# Patient Record
Sex: MALE | Race: WHITE | NOT HISPANIC OR LATINO | Employment: FULL TIME
[De-identification: names, ages, dates, MRNs, and addresses within clinical notes are randomized per-mention and may not be internally consistent; named-entity substitution may affect disease eponyms.]

---

## 2022-10-02 ENCOUNTER — HEALTH MAINTENANCE LETTER (OUTPATIENT)
Age: 37
End: 2022-10-02

## 2023-10-21 ENCOUNTER — HEALTH MAINTENANCE LETTER (OUTPATIENT)
Age: 38
End: 2023-10-21

## 2024-04-05 ENCOUNTER — LAB (OUTPATIENT)
Dept: LAB | Facility: CLINIC | Age: 39
End: 2024-04-05
Attending: PHYSICIAN ASSISTANT
Payer: COMMERCIAL

## 2024-04-05 ENCOUNTER — VIRTUAL VISIT (OUTPATIENT)
Dept: URGENT CARE | Facility: CLINIC | Age: 39
End: 2024-04-05
Payer: COMMERCIAL

## 2024-04-05 DIAGNOSIS — J02.0 STREPTOCOCCAL PHARYNGITIS: Primary | ICD-10-CM

## 2024-04-05 DIAGNOSIS — J02.9 SORE THROAT: ICD-10-CM

## 2024-04-05 LAB — DEPRECATED S PYO AG THROAT QL EIA: POSITIVE

## 2024-04-05 PROCEDURE — 87880 STREP A ASSAY W/OPTIC: CPT

## 2024-04-05 PROCEDURE — 99441 PR PHYSICIAN TELEPHONE EVALUATION 5-10 MIN: CPT

## 2024-04-05 RX ORDER — AMOXICILLIN 500 MG/1
1000 CAPSULE ORAL DAILY
Qty: 20 CAPSULE | Refills: 0 | Status: SHIPPED | OUTPATIENT
Start: 2024-04-05 | End: 2024-04-15

## 2024-04-05 NOTE — PATIENT INSTRUCTIONS
1) Increase rest and fluid intake.  2) Take Tylenol 650mg every 4 hours or ibuprofen 600mg every 6 hours by mouth for pain/fever.  Do not exceed 4000mg of acetaminophen or 2400mg of ibuprofen from any source in a 24 hour period.  Taking Tylenol and ibuprofen together may be helpful in reducing pain.   3) Strep infection is considered contagious until treated for 24 hours; avoid attending school or work during contagious period.  4) Complete full course of antibiotics.   5) Replace toothbrush after being on the antibiotic for 48 hours to avoid reinfection   6) Return if not resolved in one week or sooner if worsening.    Strep Throat  Strep throat is a throat infection caused by a bacteria called group A Streptococcus bacteria (group A strep). The bacteria live in the nose and throat. Strep throat is contagious and spreads easily from person to person through airborne droplets when an infected person coughs, sneezes, or talks. Good hand washing is important to help prevent the spread of this illness.  Children diagnosed with strep throat should not attend school or  until they have been taking antibiotics and had no fever for 24 hours.  Strep throat mainly affects school-aged children between 5 and 15 years of age, but can affect adults too. When it isn't treated, it can lead to serious problems including rheumatic fever (an inflammation of the joints and heart) and kidney damage.    How is strep throat spread?  Strep throat can be easily spread from an infected person's saliva by:  Drinking and eating after them  Sharing a straw, cup, toothbrushes, and eating utensils  When to go to the emergency room (ER)  Call 911 if you have trouble breathing or swallowing, or signs of dehydration (no urination in 8 hours, or dark urine, dry mouth).  When to call your healthcare provider  Call your healthcare provider if you have finished the treatment for strep throat and have:  Joint pain or swelling  Ear pain or  pressure  Headaches  Rash  Fever      Easing strep throat symptoms  These tips can help ease your symptoms:  Use Tylenol or ibuprofen.   Eat easy-to-swallow foods, such as soup, applesauce, popsicles, cold drinks, milk shakes, and yogurt. Avoid spicy or acidic foods.  Use a cool-mist humidifier in your bedroom.  Gargle with saltwater. Mix 1/4 teaspoon salt in 1 cup (8 oz) of warm water.

## 2024-04-05 NOTE — PROGRESS NOTES
Malik Smiley is being evaluated via a billable telephone visit.      Assessment & Plan:     Strep test ordered. Suspect strep pharyngitis vs viral pharyngitis. Will treat as appropriate for positive strep, otherwise supportive cares recommended.     See patient instructions below.  At the end of the encounter, I discussed diagnosis & medications. Discussed red flags for being seen in person at clinic/ER, as well as indications for follow up if no improvement. Patient understood and agreed to plan.       ICD-10-CM    1. Sore throat  J02.9 Streptococcus A Rapid Screen w/Reflex to PCR - Clinic Collect            No follow-ups on file.    Phone Call Duration : 5  minutes  Additional time spent documenting and reviewing chart:    ROSA Chew, HIRAM GRAJEDA UNSCHEDULED CARE  -----------------------------------------------------------------------------------------------------------------------------------------------------------------    Subjective:   Malik Smiley is a 39 year old male who is contacted via telephone through scheduled urgent care virtual visit to discuss: No chief complaint on file.      Sore throat & low grade fever onset 2 days ago. Tmax 100 F. No treatments tried. Patient reports no congestion, cough, headache, chest pain, shortness of breath, abdominal pain, nausea, vomiting, diarrhea, rash, or any other symptoms.     History reviewed. No pertinent past medical history.      Objective:   Gen:  NAD  Pulm: non-labored work of breathing    No results found for any visits on 04/05/24.    There are no Patient Instructions on file for this visit.

## 2024-04-24 ENCOUNTER — THERAPY VISIT (OUTPATIENT)
Dept: PHYSICAL THERAPY | Facility: CLINIC | Age: 39
End: 2024-04-24
Payer: COMMERCIAL

## 2024-04-24 DIAGNOSIS — M75.21 BICEPS TENDONITIS, RIGHT: Primary | ICD-10-CM

## 2024-04-24 PROCEDURE — 97161 PT EVAL LOW COMPLEX 20 MIN: CPT | Mod: GP | Performed by: PHYSICAL THERAPIST

## 2024-04-24 PROCEDURE — 97110 THERAPEUTIC EXERCISES: CPT | Mod: GP | Performed by: PHYSICAL THERAPIST

## 2024-04-24 NOTE — PROGRESS NOTES
PHYSICAL THERAPY EVALUATION  Type of Visit: Evaluation    See electronic medical record for Abuse and Falls Screening details.    Pt presents to PT with 6wk history of R midbelly bicep pain and weakness. He is limited with lifting weights and lifting 5year old son. His goal is to return to painfree lifting.    Subjective       Presenting condition or subjective complaint: bicep tendonitis  Date of onset: 02/28/24    Relevant medical history: Migraines or headaches   Dates & types of surgery: none    Prior diagnostic imaging/testing results:       Prior therapy history for the same diagnosis, illness or injury: No      Prior Level of Function  Transfers:   Ambulation:   ADL:   IADL:     Living Environment  Social support: With a significant other or spouse   Type of home: House   Stairs to enter the home:         Ramp:     Stairs inside the home:         Help at home:    Equipment owned:       Employment: Yes    Hobbies/Interests: weight lifting    Patient goals for therapy: weight lifting    Pain assessment: pain with active resisted R elbow flexion     Objective   SHOULDER EVALUATION  PAIN:   INTEGUMENTARY (edema, incisions):   POSTURE: good posture awareness  GAIT:   Weightbearing Status:   Assistive Device(s):   Gait Deviations:   BALANCE/PROPRIOCEPTION:   WEIGHTBEARING ALIGNMENT:   ROM: full and painfree B shoulder AROM  STRENGTH: BUE normal 5/5 except R elbow flx painful and 4+/5 and R ER 4+/5  FLEXIBILITY:   SPECIAL TESTS: neer neg, vega-saturnino neg, speeds + at 90 and + at 120degs  PALPATION: TTP distal midbelly R bicep  JOINT MOBILITY: WNL BUE  CERVICAL SCREEN: WNL and painfree    Assessment & Plan   CLINICAL IMPRESSIONS  Medical Diagnosis:      Treatment Diagnosis: R bicep tendonitis   Impression/Assessment: Patient is a 39 year old male with R bicep pain complaints.  The following significant findings have been identified: Pain and Decreased strength. These impairments interfere with their ability to  perform recreational activities and household chores as compared to previous level of function.     Clinical Decision Making (Complexity):  Clinical Presentation: Stable/Uncomplicated  Clinical Presentation Rationale: based on medical and personal factors listed in PT evaluation  Clinical Decision Making (Complexity): Low complexity    PLAN OF CARE  Treatment Interventions:  Modalities: Cryotherapy, Hot Pack  Interventions: Manual Therapy, Neuromuscular Re-education, Therapeutic Activity, Therapeutic Exercise    Long Term Goals     PT Goal 1  Goal Identifier: lifting  Goal Description: pt will be able to return to full weight lifting painfree  Rationale: to maximize safety and independence within the community  Target Date: 07/17/24      Frequency of Treatment: 2x/month  Duration of Treatment: 3month    Recommended Referrals to Other Professionals:   Education Assessment:   Learner/Method: Patient;Demonstration;Pictures/Video  Education Comments: anatomy and mechanics, rehab progression and expectations    Risks and benefits of evaluation/treatment have been explained.   Patient/Family/caregiver agrees with Plan of Care.     Evaluation Time:     PT Eval, Low Complexity Minutes (07080): 15       Signing Clinician: Koko White PT

## 2024-06-25 SDOH — HEALTH STABILITY: PHYSICAL HEALTH: ON AVERAGE, HOW MANY MINUTES DO YOU ENGAGE IN EXERCISE AT THIS LEVEL?: 60 MIN

## 2024-06-25 SDOH — HEALTH STABILITY: PHYSICAL HEALTH: ON AVERAGE, HOW MANY DAYS PER WEEK DO YOU ENGAGE IN MODERATE TO STRENUOUS EXERCISE (LIKE A BRISK WALK)?: 7 DAYS

## 2024-06-25 ASSESSMENT — SOCIAL DETERMINANTS OF HEALTH (SDOH): HOW OFTEN DO YOU GET TOGETHER WITH FRIENDS OR RELATIVES?: ONCE A WEEK

## 2024-06-26 ENCOUNTER — OFFICE VISIT (OUTPATIENT)
Dept: FAMILY MEDICINE | Facility: CLINIC | Age: 39
End: 2024-06-26
Payer: COMMERCIAL

## 2024-06-26 VITALS
HEIGHT: 69 IN | BODY MASS INDEX: 27.7 KG/M2 | WEIGHT: 187 LBS | RESPIRATION RATE: 16 BRPM | DIASTOLIC BLOOD PRESSURE: 66 MMHG | SYSTOLIC BLOOD PRESSURE: 106 MMHG | OXYGEN SATURATION: 97 % | TEMPERATURE: 98.9 F | HEART RATE: 64 BPM

## 2024-06-26 DIAGNOSIS — Z00.00 ENCOUNTER FOR ROUTINE ADULT HEALTH EXAMINATION WITHOUT ABNORMAL FINDINGS: Primary | ICD-10-CM

## 2024-06-26 DIAGNOSIS — Z30.2 ENCOUNTER FOR VASECTOMY: ICD-10-CM

## 2024-06-26 DIAGNOSIS — Z13.220 LIPID SCREENING: ICD-10-CM

## 2024-06-26 DIAGNOSIS — M75.21 BICEPS TENDONITIS, RIGHT: ICD-10-CM

## 2024-06-26 DIAGNOSIS — Z00.00 HEALTHCARE MAINTENANCE: ICD-10-CM

## 2024-06-26 DIAGNOSIS — L65.9 ALOPECIA: ICD-10-CM

## 2024-06-26 PROCEDURE — 99385 PREV VISIT NEW AGE 18-39: CPT | Performed by: NURSE PRACTITIONER

## 2024-06-26 PROCEDURE — 99213 OFFICE O/P EST LOW 20 MIN: CPT | Mod: 25 | Performed by: NURSE PRACTITIONER

## 2024-06-26 RX ORDER — FINASTERIDE 5 MG/1
5 TABLET, FILM COATED ORAL
COMMUNITY
Start: 2023-03-21 | End: 2024-06-26

## 2024-06-26 RX ORDER — FINASTERIDE 5 MG/1
TABLET, FILM COATED ORAL
Qty: 30 TABLET | Refills: 3 | Status: SHIPPED | OUTPATIENT
Start: 2024-06-26

## 2024-06-26 RX ORDER — MULTIPLE VITAMINS W/ MINERALS TAB 9MG-400MCG
1 TAB ORAL DAILY
COMMUNITY

## 2024-06-26 ASSESSMENT — PAIN SCALES - GENERAL: PAINLEVEL: MODERATE PAIN (4)

## 2024-06-26 ASSESSMENT — PATIENT HEALTH QUESTIONNAIRE - PHQ9
10. IF YOU CHECKED OFF ANY PROBLEMS, HOW DIFFICULT HAVE THESE PROBLEMS MADE IT FOR YOU TO DO YOUR WORK, TAKE CARE OF THINGS AT HOME, OR GET ALONG WITH OTHER PEOPLE: SOMEWHAT DIFFICULT
SUM OF ALL RESPONSES TO PHQ QUESTIONS 1-9: 9
SUM OF ALL RESPONSES TO PHQ QUESTIONS 1-9: 9

## 2024-06-26 NOTE — PROGRESS NOTES
"Preventive Care Visit  [facility] VENKAT Villareal, JOYCELYN CNP, Nurse Practitioner  Jun 26, 2024      Assessment & Plan   Problem List Items Addressed This Visit       Biceps tendonitis, right     No improvement with resting and PT. Referral given for Sports medicine         Relevant Orders    Orthopedic  Referral    Healthcare maintenance     Hep B declines  Hep C/hiv declines  Lipid screening         Relevant Orders    Glucose    Alopecia     Male pattern baldness  Takes Finasteride 1/4 tablet daily, refills given         Relevant Medications    finasteride (PROSCAR) 5 MG tablet     Other Visit Diagnoses       Encounter for routine adult health examination without abnormal findings    -  Primary    Encounter for vasectomy        Relevant Orders    Adult Urology  Referral    Testosterone Free and Total    Lipid screening        Relevant Orders    Lipid panel reflex to direct LDL Fasting    Lipoprotein (a)         Boy  forms filled out.     Patient has been advised of split billing requirements and indicates understanding: Yes       BMI  Estimated body mass index is 28.02 kg/m  as calculated from the following:    Height as of this encounter: 1.74 m (5' 8.5\").    Weight as of this encounter: 84.8 kg (187 lb).       Counseling  Appropriate preventive services were discussed with this patient, including applicable screening as appropriate for fall prevention, nutrition, physical activity, Tobacco-use cessation, weight loss and cognition.  Checklist reviewing preventive services available has been given to the patient.  Reviewed patient's diet, addressing concerns and/or questions.   The patient's PHQ-9 score is consistent with mild depression. He was provided with information regarding depression.     FUTURE APPOINTMENTS:       - Follow-up for annual visit or as needed      Liza   Malik is a 39 year old, presenting for the following:  Establish Care and Physical     "     Health Care Directive  Patient does not have a Health Care Directive or Living Will: Discussed advance care planning with patient; information given to patient to review.    HPI  Right bicep tendonitis; tried PT without relief  Nutrition: protein, foods, whole foods, wife is a vegetarian   Exercise/movement: lifting and run 4 days a week; on biking on the other days  Sleep; 7 hours of sleep no interruptions  Stress: manage  Alcohol: rarely  Tobacco: none  Drugs: marjiuana edible 1 every 2 weeks    Family history  updated      6/25/2024   General Health   How would you rate your overall physical health? Excellent   Feel stress (tense, anxious, or unable to sleep) Very much      (!) STRESS CONCERN      6/25/2024   Nutrition   Three or more servings of calcium each day? Yes   Diet: Regular (no restrictions)   How many servings of fruit and vegetables per day? (!) 2-3   How many sweetened beverages each day? 0-1            6/25/2024   Exercise   Days per week of moderate/strenous exercise 7 days   Average minutes spent exercising at this level 60 min            6/25/2024   Social Factors   Frequency of gathering with friends or relatives Once a week   Worry food won't last until get money to buy more No   Food not last or not have enough money for food? No   Do you have housing? (Housing is defined as stable permanent housing and does not include staying ouside in a car, in a tent, in an abandoned building, in an overnight shelter, or couch-surfing.) No   Are you worried about losing your housing? No   Lack of transportation? No   Unable to get utilities (heat,electricity)? No   Want help with housing or utility concern? No      (!) HOUSING CONCERN PRESENT      6/25/2024   Dental   Dentist two times every year? Yes            6/25/2024   TB Screening   Were you born outside of the [place]? No          Today's PHQ-9 Score:       6/26/2024    11:50 AM   PHQ-9 SCORE   PHQ-9 Total Score MyChart 9 (Mild depression)   PHQ-9  "Total Score 9         6/25/2024   Substance Use   Alcohol more than 3/day or more than 7/wk No   Do you use any other substances recreationally? (!) CANNABIS PRODUCTS    (!) SYNTHETIC MARIJUANA       Multiple values from one day are sorted in reverse-chronological order     Social History     Tobacco Use    Smoking status: Never    Smokeless tobacco: Never           6/25/2024   One time HIV Screening   Previous HIV test? No          6/25/2024   STI Screening   New sexual partner(s) since last STI/HIV test? No            6/25/2024   Contraception/Family Planning   Questions about contraception or family planning (!) YES considering for vasectomy        Reviewed and updated as needed this visit by Provider                    No past medical history on file.  No past surgical history on file.      Review of Systems  CONSTITUTIONAL: NEGATIVE for fever, chills, change in weight  INTEGUMENTARY/SKIN: NEGATIVE for worrisome rashes, moles or lesions  EYES: NEGATIVE for vision changes or irritation  ENT/MOUTH: NEGATIVE for ear, mouth and throat problems  RESP: NEGATIVE for significant cough or SOB  BREAST: NEGATIVE for masses, tenderness or discharge  CV: NEGATIVE for chest pain, palpitations or peripheral edema  GI: NEGATIVE for nausea, abdominal pain, heartburn, or change in bowel habits  : NEGATIVE for frequency, dysuria, or hematuria  MUSCULOSKELETAL:POSITIVE  for right bicep tendonitis  NEURO: NEGATIVE for weakness, dizziness or paresthesias  ENDOCRINE: NEGATIVE for temperature intolerance, skin/hair changes  HEME: NEGATIVE for bleeding problems  PSYCHIATRIC: NEGATIVE for changes in mood or affect     Objective    Exam  /66 (BP Location: Right arm, Patient Position: Sitting, Cuff Size: Adult Large)   Pulse 64   Temp 98.9  F (37.2  C) (Tympanic)   Resp 16   Ht 1.74 m (5' 8.5\")   Wt 84.8 kg (187 lb)   SpO2 97%   BMI 28.02 kg/m     Estimated body mass index is 28.02 kg/m  as calculated from the following:    " "Height as of this encounter: 1.74 m (5' 8.5\").    Weight as of this encounter: 84.8 kg (187 lb).    Physical Exam  Constitutional:       Appearance: Normal appearance.   HENT:      Head: Normocephalic.      Right Ear: Tympanic membrane, ear canal and external ear normal.      Left Ear: Tympanic membrane, ear canal and external ear normal.      Nose: Nose normal.      Mouth/Throat:      Mouth: Mucous membranes are moist.      Pharynx: Oropharynx is clear.      Tonsils: 0 on the right. 0 on the left.   Eyes:      Extraocular Movements: Extraocular movements intact.      Conjunctiva/sclera: Conjunctivae normal.      Pupils: Pupils are equal, round, and reactive to light.   Neck:      Thyroid: No thyroid mass, thyromegaly or thyroid tenderness.      Trachea: Trachea normal.   Cardiovascular:      Rate and Rhythm: Normal rate and regular rhythm.      Heart sounds: Normal heart sounds.   Pulmonary:      Effort: Pulmonary effort is normal.      Breath sounds: Normal breath sounds.   Abdominal:      General: Abdomen is flat. Bowel sounds are normal.   Musculoskeletal:         General: Normal range of motion.      Cervical back: Normal range of motion.   Lymphadenopathy:      Cervical: No cervical adenopathy.   Skin:     General: Skin is warm and dry.   Neurological:      Mental Status: He is alert and oriented to person, place, and time.   Psychiatric:         Mood and Affect: Mood normal.         Behavior: Behavior normal.         Thought Content: Thought content normal.         Judgment: Judgment normal.               Signed Electronically by: JOYCELYN Pearson CNP    Answers submitted by the patient for this visit:  Patient Health Questionnaire (Submitted on 6/26/2024)  If you checked off any problems, how difficult have these problems made it for you to do your work, take care of things at home, or get along with other people?: Somewhat difficult  PHQ9 TOTAL SCORE: 9    "

## 2024-10-13 NOTE — PROGRESS NOTES
UROLOGY CLINIC NEW VISIT    Chief Complaint:  Desire for Vasectomy    Referring Provider:  Gwen Villareal    Subjective:     Malik Smiley is a 39 year old male and is a new patient to the urology clinic seen today for family planning in consultation for Dr. Orourke     No prior surgeries , no other significant medical history.   No anticoagulation or bleeding dyscrasias    Has discussed with partner they are in agreement . Has 3 kids no ED or EJD    Currently the patient has no fever, chills, nausea, vomiting or other signs/symptoms suggestive of acute systemic infection.    PMH  No past medical history on file.  PSH  No past surgical history on file.  FAMHx  Family History   Problem Relation Age of Onset    No Known Problems Mother     Prostate Cancer Father     No Known Problems Sister     No Known Problems Sister     Cancer Maternal Grandmother     Myocardial Infarction Maternal Grandfather     Other - See Comments Paternal Grandmother         old age    Lung Cancer Paternal Grandfather     No Known Problems Son     No Known Problems Son     No Known Problems Son      ALLERGY  Allergies   Allergen Reactions    Ragweeds      MEDICATIONS  has a current medication list which includes the following prescription(s): finasteride and multivitamin w/minerals.  ROS:  Constitutional: negative  Eyes: negative  Ears/Nose/Throat/Mouth: negative  Respiratory: negative  Cardiovascular: negative  Gastrointestinal: negative  Genito-Urinary: as documented above in HPI  Musculoskeletal: negative  Neurological: negative  Integumentary: negative      Objective:     There were no vitals taken for this visit.   Physical Exam:  GENERAL: Patient is AOx3, in no acute distress  CARDIAC: regular rate  LUNG: breathing non labored  ABD: soft, nondistended  : normal phallus, testes descended bilaterally  Testes without masses.  Vasa and epididymides are present bilaterally.    Vasa palpable bilaterally 1         LABORATORY:  No  "results found for: \"CREATININE\"  No results found for: \"PSA\"     Assessment:       Malik Smiley is a 39 year old male and is a new patient to the urology clinic seen today for family planning in consultation, desiring elective vasectomy.      Plan:     We discussed with the patient the following:  Vasectomy is intended to be a permanent form of contraception.   Vasectomy does not produce immediate sterility.   Following vasectomy, another form of contraception is required until vas occlusion is confirmed by post- vasectomy semen analysis (PVSA).   Even after vas occlusion is confirmed, vasectomy is not 100% reliable in preventing pregnancy.   The risk of pregnancy after vasectomy is approximately 1 in 2,000 for men who have post-vasectomy azoospermia or PVSA showing rare non-motile sperm (RNMS).   Repeat vasectomy is necessary in ?1% of vasectomies, provided that a technique for vas occlusion known to have a low occlusive failure rate has been used.   Patients should refrain from ejaculation for approximately one week after vasectomy.   Options for fertility after vasectomy include vasectomy reversal and sperm retrieval with in vitro fertilization. These options are not always successful, and they may be expensive.   The rates of surgical complications such as symptomatic hematoma and infection are 1-2%. These rates vary with the surgeon's experience and the criteria used to diagnose these conditions.   Chronic scrotal pain associated with negative impact on quality of life occurs after vasectomy in about 1-2% of men. Few of these men require additional surgery.   Other permanent and non-permanent alternatives to vasectomy are available.    We discussed the need for a  if he chooses to take Valium 20 mg one hour prior to the procedure, he will decide and let us know.   He understands all risks and benefits and voiced thorough understanding, his questions were answered.  We discussed his postoperative care. He " signed the surgical permit and will be scheduled presently.

## 2024-10-14 ENCOUNTER — OFFICE VISIT (OUTPATIENT)
Dept: UROLOGY | Facility: CLINIC | Age: 39
End: 2024-10-14
Attending: NURSE PRACTITIONER
Payer: COMMERCIAL

## 2024-10-14 VITALS
HEIGHT: 69 IN | DIASTOLIC BLOOD PRESSURE: 73 MMHG | TEMPERATURE: 97.8 F | OXYGEN SATURATION: 99 % | SYSTOLIC BLOOD PRESSURE: 123 MMHG | BODY MASS INDEX: 28.02 KG/M2 | HEART RATE: 63 BPM

## 2024-10-14 DIAGNOSIS — Z30.2 ENCOUNTER FOR VASECTOMY: ICD-10-CM

## 2024-10-14 PROCEDURE — 99203 OFFICE O/P NEW LOW 30 MIN: CPT | Performed by: STUDENT IN AN ORGANIZED HEALTH CARE EDUCATION/TRAINING PROGRAM

## 2024-10-14 ASSESSMENT — PAIN SCALES - GENERAL: PAINLEVEL: NO PAIN (0)

## 2024-11-05 ENCOUNTER — OFFICE VISIT (OUTPATIENT)
Dept: FAMILY MEDICINE | Facility: CLINIC | Age: 39
End: 2024-11-05
Payer: COMMERCIAL

## 2024-11-05 VITALS
DIASTOLIC BLOOD PRESSURE: 74 MMHG | HEIGHT: 69 IN | BODY MASS INDEX: 28.23 KG/M2 | TEMPERATURE: 97.5 F | RESPIRATION RATE: 12 BRPM | WEIGHT: 190.6 LBS | HEART RATE: 65 BPM | OXYGEN SATURATION: 96 % | SYSTOLIC BLOOD PRESSURE: 116 MMHG

## 2024-11-05 DIAGNOSIS — F51.01 PRIMARY INSOMNIA: ICD-10-CM

## 2024-11-05 DIAGNOSIS — R41.89 DIFFICULTY PAYING ATTENTION: ICD-10-CM

## 2024-11-05 DIAGNOSIS — Z11.3 SCREEN FOR STD (SEXUALLY TRANSMITTED DISEASE): ICD-10-CM

## 2024-11-05 DIAGNOSIS — F43.21 ADJUSTMENT DISORDER WITH DEPRESSED MOOD: Primary | ICD-10-CM

## 2024-11-05 PROCEDURE — 99203 OFFICE O/P NEW LOW 30 MIN: CPT | Performed by: PHYSICIAN ASSISTANT

## 2024-11-05 PROCEDURE — G2211 COMPLEX E/M VISIT ADD ON: HCPCS | Performed by: PHYSICIAN ASSISTANT

## 2024-11-05 RX ORDER — HYDROXYZINE HYDROCHLORIDE 25 MG/1
50-100 TABLET, FILM COATED ORAL
Qty: 90 TABLET | Refills: 0 | Status: SHIPPED | OUTPATIENT
Start: 2024-11-05

## 2024-11-05 ASSESSMENT — ANXIETY QUESTIONNAIRES
4. TROUBLE RELAXING: MORE THAN HALF THE DAYS
6. BECOMING EASILY ANNOYED OR IRRITABLE: MORE THAN HALF THE DAYS
7. FEELING AFRAID AS IF SOMETHING AWFUL MIGHT HAPPEN: SEVERAL DAYS
IF YOU CHECKED OFF ANY PROBLEMS ON THIS QUESTIONNAIRE, HOW DIFFICULT HAVE THESE PROBLEMS MADE IT FOR YOU TO DO YOUR WORK, TAKE CARE OF THINGS AT HOME, OR GET ALONG WITH OTHER PEOPLE: VERY DIFFICULT
8. IF YOU CHECKED OFF ANY PROBLEMS, HOW DIFFICULT HAVE THESE MADE IT FOR YOU TO DO YOUR WORK, TAKE CARE OF THINGS AT HOME, OR GET ALONG WITH OTHER PEOPLE?: VERY DIFFICULT
GAD7 TOTAL SCORE: 17
7. FEELING AFRAID AS IF SOMETHING AWFUL MIGHT HAPPEN: SEVERAL DAYS
GAD7 TOTAL SCORE: 17
1. FEELING NERVOUS, ANXIOUS, OR ON EDGE: NEARLY EVERY DAY
3. WORRYING TOO MUCH ABOUT DIFFERENT THINGS: NEARLY EVERY DAY
GAD7 TOTAL SCORE: 17
2. NOT BEING ABLE TO STOP OR CONTROL WORRYING: NEARLY EVERY DAY
5. BEING SO RESTLESS THAT IT IS HARD TO SIT STILL: NEARLY EVERY DAY

## 2024-11-05 ASSESSMENT — PATIENT HEALTH QUESTIONNAIRE - PHQ9: SUM OF ALL RESPONSES TO PHQ QUESTIONS 1-9: 18

## 2024-11-05 NOTE — PATIENT INSTRUCTIONS
Re-check with me in 4-6 weeks.  This can be a video or in person visit.     Call with side effects or concerns.     Medication should start to work within 2-4 weeks but will not have full effect for about 6 weeks.     If medication makes you feel worse, stop right away and contact me.    If suicidal thoughts or plan occur, call 911 or go to emergency room.

## 2024-11-05 NOTE — PROGRESS NOTES
Assessment & Plan     Adjustment disorder with depressed mood  Not to goal  Referred for therapy, will start medications as well  Side effects of medication and expected course of condition discussed.    - Adult Mental Health  Referral; Future  - sertraline (ZOLOFT) 50 MG tablet; Take 1 tablet (50 mg) by mouth daily.    Primary insomnia  If not helpful will try trazadone, if that not helpful consider lunesta  - hydrOXYzine HCl (ATARAX) 25 MG tablet; Take 2-4 tablets ( mg) by mouth nightly as needed for other (sleep).    Difficulty paying attention  Referred for adhd testing per patient concerned he has this  - Adult Mental Health  Referral; Future    Screen for STD (sexually transmitted disease)    - HIV Antigen Antibody Combo Cascade; Future  - Treponema Abs w Reflex to RPR and Titer; Future  - Hepatitis C antibody; Future  - Hepatitis B surface antigen; Future  - Hepatitis B Surface Antibody; Future  - NEISSERIA GONORRHOEA PCR; Future  - CHLAMYDIA TRACHOMATIS PCR; Future    Denies suicidal or homicidal thoughts.  Patient instructed to go to the emergency room or call 911 if these occur.    The longitudinal plan of care for the diagnosis(es)/condition(s) as documented were addressed during this visit. Due to the added complexity in care, I will continue to support Malik in the subsequent management and with ongoing continuity of care.      Patient Instructions   Re-check with me in 4-6 weeks.  This can be a video or in person visit.     Call with side effects or concerns.     Medication should start to work within 2-4 weeks but will not have full effect for about 6 weeks.     If medication makes you feel worse, stop right away and contact me.    If suicidal thoughts or plan occur, call 911 or go to emergency room.         Depression Screening Follow Up        11/5/2024    11:26 AM   PHQ   PHQ-9 Total Score 18   Q9: Thoughts of better off dead/self-harm past 2 weeks Not at all          Liza Gan is a 39 year old, presenting for the following health issues:  Mental Health Problem        11/5/2024    10:43 AM   Additional Questions   Roomed by Renetta ALARCON CMA   Accompanied by alone         11/5/2024    10:43 AM   Patient Reported Additional Medications   Patient reports taking the following new medications None     History of Present Illness       Mental Health Follow-up:  Patient presents to follow-up on Depression & Anxiety.Patient's depression since last visit has been:  Medium  The patient is having other symptoms associated with depression.  Patient's anxiety since last visit has been:  Bad  The patient is having other symptoms associated with anxiety.  Any significant life events: relationship concerns, job concerns, financial concerns, housing concerns and grief or loss  Patient is feeling anxious or having panic attacks.  Patient has no concerns about alcohol or drug use.    Reason for visit:  Depression Anxiety Insomnia  Symptom onset:  More than a month  Symptoms include:  Inability to focus, anxiety, not being able to fall and stay asleep  Symptom intensity:  Moderate  Symptom progression:  Staying the same  Had these symptoms before:  No  What makes it worse:  No  What makes it better:  Exercise   He is taking medications regularly.         11/5/2024     9:17 AM   WU-7 SCORE   Total Score 17 (severe anxiety)   Total Score 17        Patient-reported         6/26/2024    11:50 AM 11/5/2024    11:26 AM   PHQ   PHQ-9 Total Score 9 18   Q9: Thoughts of better off dead/self-harm past 2 weeks Not at all  Not at all       Patient-reported      No history of anxiety or depression but started during divorce process with wife. Phq9 and wu elevated.   Has trouble sleeping.  Very busy. In middle of divorce- wife left, has mental health issues, patient has to be main caregiver.    Std screening-no symptoms.    SH-works for [employer] in [employer].   Denies suicidal or homicidal thoughts.   "Patient instructed to go to the emergency room or call 911 if these occur.    Can have trouble falling and staying asleep. Has 3 kids. 11 year old twins and a 5 year old.     Tried any medications in the past?:  NO  Seen a counselor before? :  NO  Previous addiction/drug use?:  NO  Suicidal thoughts?:  NO  History of bipolar or tish?:  NO  Insomnia? :  YES      Has labs pended by another provider including testosterone level.       Objective    /74   Pulse 65   Temp 97.5  F (36.4  C) (Temporal)   Resp 12   Ht 1.74 m (5' 8.5\")   Wt 86.5 kg (190 lb 9.6 oz)   SpO2 96%   BMI 28.56 kg/m    Body mass index is 28.56 kg/m .  Physical Exam   GENERAL: alert and no distress  RESP: lungs clear to auscultation - no rales, rhonchi or wheezes  CV: regular rate and rhythm, normal S1 S2, no S3 or S4, no murmur, click or rub, no peripheral edema  MS: no gross musculoskeletal defects noted, no edema  PSYCH: mentation appears normal, affect normal/bright          Signed Electronically by: Ramya Darden PA-C    "

## 2024-11-11 NOTE — PROGRESS NOTES
ASSESSMENT & PLAN    Malik was seen today for pain.    Diagnoses and all orders for this visit:    Strain of right biceps, initial encounter  -     Orthopedic  Referral  -     XR Elbow RT G/E 3 vw; Future        See Patient Instructions  Patient Instructions   Right anterior upper arm pain consistent with biceps source.  X-rays today reassuring, no clear bone or joint abnormality in this area.  Overall, function is good, with some discomfort with activating and stressing the biceps.  While symptoms have been ongoing for quite some time, still potential for improvement.  Considerations include continued monitoring, continuing with therapy exercises or return to physical therapy, as well as additional imaging with MRI.  For now, okay to continue with monitoring, and okay for gradual return to activities if comfortable doing so.  If worsening or changing symptoms, or any other concerns, contact clinic.  Otherwise, will leave follow-up open ended.    If you have any further questions for your physician or physician s care team you can contact them thru MyChart or by calling [phone].      Maurice Mike [facility]    -----  Chief Complaint   Patient presents with    Right Shoulder - Pain       SUBJECTIVE  Malik REYNALDO Smiley is a/an 39 year old male who is seen in consultation at the request of  Gwen Villareal C.N.P. for evaluation of right shoulder/upper arm.     The patient is seen by themselves.  The patient is Right handed    Onset: 8 month(s) ago. Reports insidious onset without acute precipitating event. Notes that the pain seems to be better over time.  Location of Pain: right elbow, medial and diffuse anterior elbow  Worsened by: Pronation and supination  Better with:   Treatments tried: 1 visit with PT  Associated symptoms: no distal numbness or tingling; denies swelling or warmth    Orthopedic/Surgical history: NO  Social History/Occupation: [place]  [employer]    **  Above information per rooming staff.  Additional history:  Recalls onset earlier this year, started around heavy snowfall and shoveling late spring, but onset prior to that.  Noted was difficult to hold any weight.  Pain has been mid-distal biceps area.  No swelling or bruising. No clear noise.        REVIEW OF SYSTEMS:  Review of Systems    OBJECTIVE:  Ht 1.753 m (5' 9\")   Wt 86.2 kg (190 lb)   BMI 28.06 kg/m           Right Shoulder exam    ROM:      forward flexion         abduction        internal rotation        external rotation   Full, no change          Right Elbow exam:    ROM:     full with flexion, extension, forearm supination and pronation.  Some discomfort reaching out front with shoulder flexed and elbow extended    Strength:     flexion 5/5       forearm supination 5/5  No significant change in pain with testing    Tender: mild distal biceps area, near myotendinous junction  Appears able to hook distal biceps tendon          RADIOLOGY:  Final results and radiologist's interpretation, available in the [facility] health record.  Images were reviewed with the patient in the office today.  My personal interpretation of the performed imaging: no acute bony abnormality noted. No clear joint effusion.        Recent Results (from the past 24 hours)   XR Elbow RT G/E 3 vw    Narrative    XR ELBOW RIGHT G/E 3 VIEWS   11/12/2024 9:34 AM     HISTORY: Diffuse anterior elbow pain with pronation and supination;  Biceps tendonitis, right  COMPARISON: None.       Impression    IMPRESSION: No acute fracture. No elbow joint effusion. Joint spaces  are normal.    JACQUES ARNDT MD         SYSTEM ID:  [id number]           "

## 2024-11-12 ENCOUNTER — ANCILLARY PROCEDURE (OUTPATIENT)
Dept: GENERAL RADIOLOGY | Facility: CLINIC | Age: 39
End: 2024-11-12
Attending: PEDIATRICS
Payer: COMMERCIAL

## 2024-11-12 ENCOUNTER — OFFICE VISIT (OUTPATIENT)
Dept: ORTHOPEDICS | Facility: CLINIC | Age: 39
End: 2024-11-12
Attending: NURSE PRACTITIONER
Payer: COMMERCIAL

## 2024-11-12 VITALS — HEIGHT: 69 IN | BODY MASS INDEX: 28.14 KG/M2 | WEIGHT: 190 LBS

## 2024-11-12 DIAGNOSIS — S46.211A STRAIN OF RIGHT BICEPS, INITIAL ENCOUNTER: Primary | ICD-10-CM

## 2024-11-12 DIAGNOSIS — M75.21 BICEPS TENDONITIS, RIGHT: ICD-10-CM

## 2024-11-12 PROCEDURE — 73080 X-RAY EXAM OF ELBOW: CPT | Mod: TC | Performed by: RADIOLOGY

## 2024-11-12 PROCEDURE — 99203 OFFICE O/P NEW LOW 30 MIN: CPT | Performed by: PEDIATRICS

## 2024-11-12 NOTE — PATIENT INSTRUCTIONS
Right anterior upper arm pain consistent with biceps source.  X-rays today reassuring, no clear bone or joint abnormality in this area.  Overall, function is good, with some discomfort with activating and stressing the biceps.  While symptoms have been ongoing for quite some time, still potential for improvement.  Considerations include continued monitoring, continuing with therapy exercises or return to physical therapy, as well as additional imaging with MRI.  For now, okay to continue with monitoring, and okay for gradual return to activities if comfortable doing so.  If worsening or changing symptoms, or any other concerns, contact clinic.  Otherwise, will leave follow-up open ended.    If you have any further questions for your physician or physician s care team you can contact them thru ReSnapt or by calling [phone].

## 2024-11-12 NOTE — LETTER
11/12/2024      Malik Smiley  [address]  [place] [address]      Dear Colleague,    Thank you for referring your patient, Malik Smiley, to the [facility]. Please see a copy of my visit note below.    ASSESSMENT & PLAN    Malik was seen today for pain.    Diagnoses and all orders for this visit:    Strain of right biceps, initial encounter  -     Orthopedic  Referral  -     XR Elbow RT G/E 3 vw; Future        See Patient Instructions  Patient Instructions   Right anterior upper arm pain consistent with biceps source.  X-rays today reassuring, no clear bone or joint abnormality in this area.  Overall, function is good, with some discomfort with activating and stressing the biceps.  While symptoms have been ongoing for quite some time, still potential for improvement.  Considerations include continued monitoring, continuing with therapy exercises or return to physical therapy, as well as additional imaging with MRI.  For now, okay to continue with monitoring, and okay for gradual return to activities if comfortable doing so.  If worsening or changing symptoms, or any other concerns, contact clinic.  Otherwise, will leave follow-up open ended.    If you have any further questions for your physician or physician s care team you can contact them thru MyChart or by calling [phone].      Maurice Mike DO  [facility]    -----  Chief Complaint   Patient presents with     Right Shoulder - Pain       SUBJECTIVE  Malik Smiley is a/an 39 year old male who is seen in consultation at the request of  Gwen Villareal C.N.P. for evaluation of right shoulder/upper arm.     The patient is seen by themselves.  The patient is Right handed    Onset: 8 month(s) ago. Reports insidious onset without acute precipitating event. Notes that the pain seems to be better over time.  Location of Pain: right elbow, medial and diffuse anterior  "elbow  Worsened by: Pronation and supination  Better with:   Treatments tried: 1 visit with PT  Associated symptoms: no distal numbness or tingling; denies swelling or warmth    Orthopedic/Surgical history: NO  Social History/Occupation: [employer]    **  Above information per rooming staff.  Additional history:  Recalls onset earlier this year, started around heavy snowfall and shoveling late spring, but onset prior to that.  Noted was difficult to hold any weight.  Pain has been mid-distal biceps area.  No swelling or bruising. No clear noise.        REVIEW OF SYSTEMS:  Review of Systems    OBJECTIVE:  Ht 1.753 m (5' 9\")   Wt 86.2 kg (190 lb)   BMI 28.06 kg/m           Right Shoulder exam    ROM:      forward flexion         abduction        internal rotation        external rotation   Full, no change          Right Elbow exam:    ROM:     full with flexion, extension, forearm supination and pronation.  Some discomfort reaching out front with shoulder flexed and elbow extended    Strength:     flexion 5/5       forearm supination 5/5  No significant change in pain with testing    Tender: mild distal biceps area, near myotendinous junction  Appears able to hook distal biceps tendon          RADIOLOGY:  Final results and radiologist's interpretation, available in the [facility] health record.  Images were reviewed with the patient in the office today.  My personal interpretation of the performed imaging: no acute bony abnormality noted. No clear joint effusion.        Recent Results (from the past 24 hours)   XR Elbow RT G/E 3 vw    Narrative    XR ELBOW RIGHT G/E 3 VIEWS   11/12/2024 9:34 AM     HISTORY: Diffuse anterior elbow pain with pronation and supination;  Biceps tendonitis, right  COMPARISON: None.       Impression    IMPRESSION: No acute fracture. No elbow joint effusion. Joint spaces  are normal.    JACQUES ARNDT MD         SYSTEM ID:  [id number]           Again, thank you for allowing me to " participate in the care of your patient.        Sincerely,        Maurice Mike, DO

## 2024-11-13 ENCOUNTER — LAB (OUTPATIENT)
Dept: LAB | Facility: CLINIC | Age: 39
End: 2024-11-13
Payer: COMMERCIAL

## 2024-11-13 DIAGNOSIS — Z00.00 HEALTHCARE MAINTENANCE: ICD-10-CM

## 2024-11-13 DIAGNOSIS — Z30.2 ENCOUNTER FOR VASECTOMY: ICD-10-CM

## 2024-11-13 DIAGNOSIS — Z13.220 LIPID SCREENING: ICD-10-CM

## 2024-11-13 DIAGNOSIS — Z11.3 SCREEN FOR STD (SEXUALLY TRANSMITTED DISEASE): ICD-10-CM

## 2024-11-13 LAB
APO A-I SERPL-MCNC: 6 MG/DL
CHOLEST SERPL-MCNC: 164 MG/DL
FASTING STATUS PATIENT QL REPORTED: YES
FASTING STATUS PATIENT QL REPORTED: YES
GLUCOSE SERPL-MCNC: 98 MG/DL (ref 70–99)
HBV SURFACE AB SERPL IA-ACNC: <3.5 M[IU]/ML
HBV SURFACE AB SERPL IA-ACNC: NONREACTIVE M[IU]/ML
HBV SURFACE AG SERPL QL IA: NONREACTIVE
HCV AB SERPL QL IA: NONREACTIVE
HDLC SERPL-MCNC: 45 MG/DL
HIV 1+2 AB+HIV1 P24 AG SERPL QL IA: NONREACTIVE
LDLC SERPL CALC-MCNC: 107 MG/DL
NONHDLC SERPL-MCNC: 119 MG/DL
SHBG SERPL-SCNC: 24 NMOL/L (ref 11–80)
T PALLIDUM AB SER QL: NONREACTIVE
TRIGL SERPL-MCNC: 60 MG/DL

## 2024-11-13 PROCEDURE — 83695 ASSAY OF LIPOPROTEIN(A): CPT

## 2024-11-13 PROCEDURE — 86803 HEPATITIS C AB TEST: CPT

## 2024-11-13 PROCEDURE — 80061 LIPID PANEL: CPT

## 2024-11-13 PROCEDURE — 36415 COLL VENOUS BLD VENIPUNCTURE: CPT

## 2024-11-13 PROCEDURE — 87340 HEPATITIS B SURFACE AG IA: CPT

## 2024-11-13 PROCEDURE — 86706 HEP B SURFACE ANTIBODY: CPT

## 2024-11-13 PROCEDURE — 84270 ASSAY OF SEX HORMONE GLOBUL: CPT

## 2024-11-13 PROCEDURE — 87591 N.GONORRHOEAE DNA AMP PROB: CPT

## 2024-11-13 PROCEDURE — 87491 CHLMYD TRACH DNA AMP PROBE: CPT

## 2024-11-13 PROCEDURE — 87389 HIV-1 AG W/HIV-1&-2 AB AG IA: CPT

## 2024-11-13 PROCEDURE — 82947 ASSAY GLUCOSE BLOOD QUANT: CPT

## 2024-11-13 PROCEDURE — 84403 ASSAY OF TOTAL TESTOSTERONE: CPT

## 2024-11-13 PROCEDURE — 86780 TREPONEMA PALLIDUM: CPT

## 2024-11-14 LAB
C TRACH DNA SPEC QL NAA+PROBE: NEGATIVE
N GONORRHOEA DNA SPEC QL NAA+PROBE: NEGATIVE

## 2024-11-14 NOTE — RESULT ENCOUNTER NOTE
Lilibeth Gan,       Your recent test results are attached, if you have any questions or concerns please feel free to contact me via e-mail or call [phone].   All STD testing is negative/normal.  No hepatitis B, hepatitis C, HIV, syphilis, chlamydia or gonorrhea.  Always use condoms to prevent the spread of STDS.  Re-test after every new partner or possible exposure.          It was a pleasure to see you at your recent office visit.      Sincerely,  Ramya Darden PA-C

## 2024-11-15 LAB
TESTOST FREE SERPL-MCNC: 11.79 NG/DL
TESTOST SERPL-MCNC: 477 NG/DL (ref 240–950)

## 2024-12-01 NOTE — PROGRESS NOTES
Pre-procedure Diagnoses   Family Planing     Post-procedure Diagnoses   Sterilization, family planing      Surgeon: Dr. Orourke    Procedures   Bilateral VASECTOMY     VASECTOMY PROCEDURE     After consenting and explaining the procedure to the patient in details, a surgical time out was performed that included a confirmation of the patient's identity using two identifiers, the correct procedure and the correct site/side for the procedure. With the patient supine on the procedure table, his genitalia was prepped and draped in the usual sterile fashion. The right vas deferens was isolated beneath the skin. The overlying skin as well as the deeper tissues were infiltrated with local anesthetic.  Subsequently, using a 15 blade the skin was incised and entered. Using the ring vasectomy tenaculum, the vas was secured. The vasal sheath was incised the adventitia of the vas stripped proximally and distally for 1-2 centimeter. A half cm segment of the vas was excised. The lumen of the vas was fulgurated proximally and distally and a medium clip was placed just proximal to each free end. The lateral end of the vas was sutured to the vasal sheath using a Monocryl stitch to seclude it from the opposing vas. Hemostasis was achieved, the cut ends were returned to the right hemiscrotum and skin stitched with 4-0 Monocryl. The remainder of procedure was repeated on the left side as it had been done on the right. Hemostasis being adequate and no evidence of bleeding was noted.  A light gauze dressing was applied after the application of Bacitracin ointment to the skin incision. Patient tolerated procedure without any complications or concerns. Bilateral vasal specimen sent to pathology.     Patient will have post-vasectomy semen analysis in 12 weeks and return to clinic. Post-procedural instructions given to patient as listed in patient instructions. A no narcotic pathway was utilized.    Loi Orourke MD, MS  Department of  Urology  Infertility, Sexual Medicine, Reconstruction  [facility]

## 2024-12-02 ENCOUNTER — OFFICE VISIT (OUTPATIENT)
Dept: UROLOGY | Facility: CLINIC | Age: 39
End: 2024-12-02
Payer: COMMERCIAL

## 2024-12-02 VITALS
BODY MASS INDEX: 28.14 KG/M2 | HEART RATE: 80 BPM | DIASTOLIC BLOOD PRESSURE: 76 MMHG | SYSTOLIC BLOOD PRESSURE: 136 MMHG | TEMPERATURE: 98 F | WEIGHT: 190 LBS | OXYGEN SATURATION: 98 % | HEIGHT: 69 IN

## 2024-12-02 DIAGNOSIS — Z30.2 ENCOUNTER FOR STERILIZATION: ICD-10-CM

## 2024-12-02 DIAGNOSIS — Z30.2 ENCOUNTER FOR VASECTOMY: Primary | ICD-10-CM

## 2024-12-02 PROCEDURE — 55250 REMOVAL OF SPERM DUCT(S): CPT | Performed by: STUDENT IN AN ORGANIZED HEALTH CARE EDUCATION/TRAINING PROGRAM

## 2024-12-02 ASSESSMENT — PAIN SCALES - GENERAL: PAINLEVEL_OUTOF10: NO PAIN (0)

## 2024-12-04 LAB
PATH REPORT.COMMENTS IMP SPEC: NORMAL
PATH REPORT.FINAL DX SPEC: NORMAL
PATH REPORT.FINAL DX SPEC: NORMAL
PATH REPORT.GROSS SPEC: NORMAL
PATH REPORT.GROSS SPEC: NORMAL
PATH REPORT.MICROSCOPIC SPEC OTHER STN: NORMAL
PATH REPORT.MICROSCOPIC SPEC OTHER STN: NORMAL
PATH REPORT.RELEVANT HX SPEC: NORMAL
PATH REPORT.RELEVANT HX SPEC: NORMAL
PHOTO IMAGE: NORMAL
PHOTO IMAGE: NORMAL

## 2024-12-11 ENCOUNTER — E-VISIT (OUTPATIENT)
Dept: FAMILY MEDICINE | Facility: CLINIC | Age: 39
End: 2024-12-11
Payer: COMMERCIAL

## 2024-12-11 DIAGNOSIS — G43.019 INTRACTABLE MIGRAINE WITHOUT AURA AND WITHOUT STATUS MIGRAINOSUS: Primary | ICD-10-CM

## 2024-12-11 RX ORDER — SUMATRIPTAN SUCCINATE 25 MG/1
25 TABLET ORAL
Qty: 9 TABLET | Refills: 1 | Status: SHIPPED | OUTPATIENT
Start: 2024-12-11

## 2024-12-11 NOTE — PATIENT INSTRUCTIONS
Thank you for choosing us for your care. I have placed an order for a prescription so that you can start treatment. View your full visit summary for details by clicking on the link below. Your pharmacist will able to address any questions you may have about the medication.     If you're not feeling better within 5-7 days, please schedule an appointment.  You can schedule an appointment right here in [facility], or call [phone]  If the visit is for the same symptoms as your eVisit, we'll refund the cost of your eVisit if seen within seven days.

## 2025-02-12 ENCOUNTER — MYC MEDICAL ADVICE (OUTPATIENT)
Dept: FAMILY MEDICINE | Facility: CLINIC | Age: 40
End: 2025-02-12
Payer: COMMERCIAL

## 2025-02-12 DIAGNOSIS — F43.21 ADJUSTMENT DISORDER WITH DEPRESSED MOOD: ICD-10-CM

## 2025-02-12 NOTE — TELEPHONE ENCOUNTER
Routed to provider for fuad refill.      Nyasia BSN   Triage Nurse RN  [facility]

## 2025-02-13 NOTE — TELEPHONE ENCOUNTER
Routed to provider to advise on fuad refill.      Nyasia BSN   Triage Nurse RN  [facility]

## 2025-02-18 ENCOUNTER — VIRTUAL VISIT (OUTPATIENT)
Dept: FAMILY MEDICINE | Facility: CLINIC | Age: 40
End: 2025-02-18
Payer: COMMERCIAL

## 2025-02-18 DIAGNOSIS — N52.2 DRUG-INDUCED ERECTILE DYSFUNCTION: ICD-10-CM

## 2025-02-18 DIAGNOSIS — F43.21 ADJUSTMENT DISORDER WITH DEPRESSED MOOD: ICD-10-CM

## 2025-02-18 DIAGNOSIS — F51.01 PRIMARY INSOMNIA: Primary | ICD-10-CM

## 2025-02-18 PROCEDURE — 98006 SYNCH AUDIO-VIDEO EST MOD 30: CPT | Performed by: PHYSICIAN ASSISTANT

## 2025-02-18 RX ORDER — SILDENAFIL 100 MG/1
50-100 TABLET, FILM COATED ORAL DAILY PRN
Qty: 30 TABLET | Refills: 3 | Status: SHIPPED | OUTPATIENT
Start: 2025-02-18

## 2025-02-18 ASSESSMENT — ANXIETY QUESTIONNAIRES
2. NOT BEING ABLE TO STOP OR CONTROL WORRYING: SEVERAL DAYS
GAD7 TOTAL SCORE: 9
1. FEELING NERVOUS, ANXIOUS, OR ON EDGE: SEVERAL DAYS
IF YOU CHECKED OFF ANY PROBLEMS ON THIS QUESTIONNAIRE, HOW DIFFICULT HAVE THESE PROBLEMS MADE IT FOR YOU TO DO YOUR WORK, TAKE CARE OF THINGS AT HOME, OR GET ALONG WITH OTHER PEOPLE: SOMEWHAT DIFFICULT
4. TROUBLE RELAXING: MORE THAN HALF THE DAYS
GAD7 TOTAL SCORE: 9
5. BEING SO RESTLESS THAT IT IS HARD TO SIT STILL: NEARLY EVERY DAY
8. IF YOU CHECKED OFF ANY PROBLEMS, HOW DIFFICULT HAVE THESE MADE IT FOR YOU TO DO YOUR WORK, TAKE CARE OF THINGS AT HOME, OR GET ALONG WITH OTHER PEOPLE?: SOMEWHAT DIFFICULT
7. FEELING AFRAID AS IF SOMETHING AWFUL MIGHT HAPPEN: NOT AT ALL
6. BECOMING EASILY ANNOYED OR IRRITABLE: SEVERAL DAYS
3. WORRYING TOO MUCH ABOUT DIFFERENT THINGS: SEVERAL DAYS
GAD7 TOTAL SCORE: 9
7. FEELING AFRAID AS IF SOMETHING AWFUL MIGHT HAPPEN: NOT AT ALL

## 2025-02-18 ASSESSMENT — PATIENT HEALTH QUESTIONNAIRE - PHQ9
SUM OF ALL RESPONSES TO PHQ QUESTIONS 1-9: 10
SUM OF ALL RESPONSES TO PHQ QUESTIONS 1-9: 10
10. IF YOU CHECKED OFF ANY PROBLEMS, HOW DIFFICULT HAVE THESE PROBLEMS MADE IT FOR YOU TO DO YOUR WORK, TAKE CARE OF THINGS AT HOME, OR GET ALONG WITH OTHER PEOPLE: SOMEWHAT DIFFICULT

## 2025-02-18 ASSESSMENT — ENCOUNTER SYMPTOMS: NERVOUS/ANXIOUS: 1

## 2025-02-18 NOTE — PROGRESS NOTES
Malik is a 39 year old who is being evaluated via a billable video visit.    How would you like to obtain your AVS? KnowableharSetup  If the video visit is dropped, the invitation should be resent by: Text to cell phone: [phone]  Will anyone else be joining your video visit? No      Assessment & Plan     Primary insomnia  See hpi, monitor for now, consider lunesta in future if needed    Adjustment disorder with depressed mood  Improved but wtih side effects  Discussed wellbutrin versus viagra prn, prefers to try viagra prn  Also consider lowering zoloft dosage in future  - sertraline (ZOLOFT) 50 MG tablet; Take 1 tablet (50 mg) by mouth daily.    Drug-induced erectile dysfunction    - sildenafil (VIAGRA) 100 MG tablet; Take 0.5-1 tablets ( mg) by mouth daily as needed (sexual activity). Prior to sexual activity.      The longitudinal plan of care for the diagnosis(es)/condition(s) as documented were addressed during this visit. Due to the added complexity in care, I will continue to support Malik in the subsequent management and with ongoing continuity of care.    F/u 1 year sooner if needed    The longitudinal plan of care for the diagnosis(es)/condition(s) as documented were addressed during this visit. Due to the added complexity in care, I will continue to support Malik in the subsequent management and with ongoing continuity of care.      BMI      Subjective   Malik is a 39 year old, presenting for the following health issues:  Refill Request, Anxiety, and Depression        2/18/2025     8:39 AM   Additional Questions   Roomed by Pt Completed E-check in via [facility]         2/18/2025     8:38 AM   Patient Reported Additional Medications   Patient reports taking the following new medications No Medications Missing     Anxiety    History of Present Illness       Mental Health Follow-up:  Patient presents to follow-up on Anxiety.    Patient's anxiety since last visit has been:  Better  The patient is not having other  symptoms associated with anxiety.  Any significant life events: relationship concerns and job concerns  Patient is not feeling anxious or having panic attacks.  Patient has no concerns about alcohol or drug use.        Started sertaline 11-5-24. Referred to therapy also. Atarax prn sleep but felt groggy the next day.   Sleep is a little bit better.   Some libido drop on medication and difficulty orgasm.         Objective           Vitals:  No vitals were obtained today due to virtual visit.    Physical Exam   GENERAL: alert and no distress  EYES: Eyes grossly normal to inspection.  No discharge or erythema, or obvious scleral/conjunctival abnormalities.  RESP: No audible wheeze, cough, or visible cyanosis.    SKIN: Visible skin clear. No significant rash, abnormal pigmentation or lesions.  NEURO: Cranial nerves grossly intact.  Mentation and speech appropriate for age.  PSYCH: Appropriate affect, tone, and pace of words          Video-Visit Details    Type of service:  Video Visit   Originating Location (pt. Location): Home    Distant Location (provider location):  On-site  Platform used for Video Visit: Maddy  Signed Electronically by: Ramya Darden PA-C

## 2025-05-28 ENCOUNTER — PATIENT OUTREACH (OUTPATIENT)
Dept: CARE COORDINATION | Facility: CLINIC | Age: 40
End: 2025-05-28
Payer: COMMERCIAL

## 2025-06-11 ENCOUNTER — PATIENT OUTREACH (OUTPATIENT)
Dept: CARE COORDINATION | Facility: CLINIC | Age: 40
End: 2025-06-11
Payer: COMMERCIAL

## 2025-07-18 PROBLEM — F43.21 ADJUSTMENT DISORDER WITH DEPRESSED MOOD: Status: RESOLVED | Noted: 2024-11-05 | Resolved: 2025-07-18

## 2025-07-20 DIAGNOSIS — L65.9 ALOPECIA: ICD-10-CM

## 2025-07-21 ENCOUNTER — PATIENT OUTREACH (OUTPATIENT)
Dept: CARE COORDINATION | Facility: CLINIC | Age: 40
End: 2025-07-21
Payer: COMMERCIAL

## 2025-07-21 RX ORDER — FINASTERIDE 5 MG/1
TABLET, FILM COATED ORAL
Qty: 7 TABLET | Refills: 17 | Status: SHIPPED | OUTPATIENT
Start: 2025-07-21

## 2025-07-23 ENCOUNTER — PATIENT OUTREACH (OUTPATIENT)
Dept: CARE COORDINATION | Facility: CLINIC | Age: 40
End: 2025-07-23
Payer: COMMERCIAL